# Patient Record
(demographics unavailable — no encounter records)

---

## 2025-03-18 NOTE — PHYSICAL EXAM
[de-identified] : well-developed pleasant female, NAD [de-identified] : NC/AT [de-identified] : nonlabored breathing  [de-identified] : Abd is softly distended, non tender, incision healing well, c/d/i, umbilicus viable, L drain functional with 10cc of serosanguinou in bulb [de-identified] : c/w generalized vitiligo

## 2025-03-18 NOTE — HISTORY OF PRESENT ILLNESS
[FreeTextEntry1] : 52 yo F  with PMHx of GERD, Vitiligo and radiculopathy who presents today for evaluation for panniculectomy. Pt lost over 30 lbs with diet and exercise, original max weight was 190 lbs and now c/o hanging abdominal skin causing severe pruritus and ongoing recurrent tinea infection treated for over 1 year with several prescribed medications including Ketoconazole with no significant improvement. She is c/o constant malodor from the lower abdominal skin folds and has difficulty with personal hygiene. Patient is also c/o lower back pain and is taking muscle relaxants, Gabapentin and OTC NSAIDs as well as attending PT 3x per week for several months with modest relief.  She is referred by PMD Dr. Bradford for panniculectomy evaluation due to failure of conservative medical therapy.   ~stable weight 1 year non-smoker  occupation: paraprofessional in elementary school  Interval hx (3/19/24). Pt here for f/u interested in scheduling panniculectomy.   Interval hx (24). Pt presents today POD#7 s/p panniculectomy with UHR c/o incisional discomfort and swelling. Taking all prescribed medications. Denies any f/c or drainage. Drains with appropriate output L>R.   Interval hx (24). Pt presents today POD#13 s/p panniculectomy with UHR. Pt is doing well, concerned with swelling. Wearing abd binder. L drain still in place, has not been stripping, . Denies f/c/drainage. Ambulating. Still taking gabapentin and Tylenol for pain.   Interval hx (5/10/24). Pt presents today POD#16 s/p panniculectomy with UHR. Here for L drain removal, states drain has remained <20ccs. Denies f/c/drainage. Ambulating. Wearing abd binder.

## 2025-03-18 NOTE — DATA REVIEWED
[FreeTextEntry1] : Evelyn Accession Number : [id number] Patient:     DESTINI BEAVERS   Accession:                             [id number]  Collected Date/Time:                   4/24/2024 13:19 EDT Received Date/Time:                    4/24/2024 13:36 EDT  Surgical Pathology Report - Auth (Verified)  Specimen(s) Submitted Abdominal pannus 2100 gm fresh  Final Diagnosis Abdominal pannus: -  Skin with subcutaneous fatty tissue showing fibrosis in the dermis.  Verified by: Gillian Null M.D. (Electronic Signature) Reported on: 04/29/24 15:54 EDT, [facility], [address] [place] [address] Phone: [phone]   Fax: [phone]

## 2025-03-18 NOTE — ASSESSMENT
[FreeTextEntry1] : 52 yo F with abdominal pannus causing longstanding tinea infection of abdominal folds who failed conservative treatment and is a good candidate for panniculectomy.   Now POD#16 s/p panniculectomy with UHR. Doing well.   -d/c L drain, baci BID untl closed - Aquaphor or gentle skin moisturizer to entire incision - signs and symptoms of infection reviewed - No heavy lifting/pushing/pulling > 10lbs until 6 weeks from surgery - No cardiovascular activity / inc heart rate until 3 weeks from surgery - Continue compression garment, must be snug - Path reviewed, benign skin, 2118gm - All post op instructions reviewed, all questions answered - f/u 1 month for clearance to return to full activity and scar management   7/2/2024 as above doing well 6 wks s/p pannic  all ?s asnwered  Wound care instructions given.  3/18/2025 11 month postop pannic  gained approx 13 lbs--pt lessa active  encouraged to exercise, no limits  pt to discuss w PMD GLP inhibitors